# Patient Record
Sex: MALE | Race: WHITE
[De-identification: names, ages, dates, MRNs, and addresses within clinical notes are randomized per-mention and may not be internally consistent; named-entity substitution may affect disease eponyms.]

---

## 2017-02-23 ENCOUNTER — HOSPITAL ENCOUNTER (EMERGENCY)
Dept: HOSPITAL 17 - PHED | Age: 72
Discharge: HOME | End: 2017-02-23
Payer: COMMERCIAL

## 2017-02-23 VITALS
RESPIRATION RATE: 18 BRPM | DIASTOLIC BLOOD PRESSURE: 92 MMHG | SYSTOLIC BLOOD PRESSURE: 184 MMHG | OXYGEN SATURATION: 93 % | HEART RATE: 84 BPM | TEMPERATURE: 97.9 F

## 2017-02-23 VITALS
DIASTOLIC BLOOD PRESSURE: 87 MMHG | SYSTOLIC BLOOD PRESSURE: 161 MMHG | RESPIRATION RATE: 18 BRPM | OXYGEN SATURATION: 96 % | HEART RATE: 87 BPM

## 2017-02-23 VITALS
HEART RATE: 81 BPM | SYSTOLIC BLOOD PRESSURE: 169 MMHG | RESPIRATION RATE: 18 BRPM | OXYGEN SATURATION: 96 % | DIASTOLIC BLOOD PRESSURE: 94 MMHG

## 2017-02-23 VITALS — HEIGHT: 70 IN | WEIGHT: 277.78 LBS | BODY MASS INDEX: 39.77 KG/M2

## 2017-02-23 VITALS
SYSTOLIC BLOOD PRESSURE: 162 MMHG | RESPIRATION RATE: 18 BRPM | HEART RATE: 84 BPM | DIASTOLIC BLOOD PRESSURE: 88 MMHG | OXYGEN SATURATION: 96 %

## 2017-02-23 DIAGNOSIS — B34.9: ICD-10-CM

## 2017-02-23 DIAGNOSIS — R94.31: ICD-10-CM

## 2017-02-23 DIAGNOSIS — H61.23: ICD-10-CM

## 2017-02-23 DIAGNOSIS — H83.09: Primary | ICD-10-CM

## 2017-02-23 DIAGNOSIS — N18.9: ICD-10-CM

## 2017-02-23 LAB
ALP SERPL-CCNC: 85 U/L (ref 45–117)
ALT SERPL-CCNC: 28 U/L (ref 12–78)
ANION GAP SERPL CALC-SCNC: 10 MEQ/L (ref 5–15)
AST SERPL-CCNC: 17 U/L (ref 15–37)
BACTERIA #/AREA URNS HPF: (no result) /HPF
BASOPHILS # BLD AUTO: 0.2 TH/MM3 (ref 0–0.2)
BASOPHILS NFR BLD: 2.1 % (ref 0–2)
BILIRUB SERPL-MCNC: 0.3 MG/DL (ref 0.2–1)
BUN SERPL-MCNC: 41 MG/DL (ref 7–18)
CHLORIDE SERPL-SCNC: 105 MEQ/L (ref 98–107)
COLOR UR: YELLOW
COMMENT (UR): (no result)
CULTURE IF INDICATED: (no result)
EOSINOPHIL # BLD: 0.8 TH/MM3 (ref 0–0.4)
EOSINOPHIL NFR BLD: 8.4 % (ref 0–4)
ERYTHROCYTE [DISTWIDTH] IN BLOOD BY AUTOMATED COUNT: 12.9 % (ref 11.6–17.2)
GFR SERPLBLD BASED ON 1.73 SQ M-ARVRAT: 28 ML/MIN (ref 89–?)
GLUCOSE UR STRIP-MCNC: (no result) MG/DL
HCO3 BLD-SCNC: 24.5 MEQ/L (ref 21–32)
HCT VFR BLD CALC: 42.4 % (ref 39–51)
HEMO FLAGS: (no result)
HGB UR QL STRIP: (no result)
KETONES UR STRIP-MCNC: (no result) MG/DL
LEUKOCYTE ESTERASE UR QL STRIP: (no result) /HPF (ref 0–5)
LYMPHOCYTES # BLD AUTO: 1.9 TH/MM3 (ref 1–4.8)
LYMPHOCYTES NFR BLD AUTO: 21.4 % (ref 9–44)
MAGNESIUM SERPL-MCNC: 2.4 MG/DL (ref 1.5–2.5)
MCH RBC QN AUTO: 28.6 PG (ref 27–34)
MCHC RBC AUTO-ENTMCNC: 32.9 % (ref 32–36)
MCV RBC AUTO: 86.8 FL (ref 80–100)
MONOCYTES NFR BLD: 6.2 % (ref 0–8)
NEUTROPHILS # BLD AUTO: 5.5 TH/MM3 (ref 1.8–7.7)
NEUTROPHILS NFR BLD AUTO: 61.9 % (ref 16–70)
NITRITE UR QL STRIP: (no result)
PLATELET # BLD: 254 TH/MM3 (ref 150–450)
POTASSIUM SERPL-SCNC: 4.2 MEQ/L (ref 3.5–5.1)
RBC # BLD AUTO: 4.89 MIL/MM3 (ref 4.5–5.9)
RBC #/AREA URNS HPF: (no result) /HPF (ref 0–3)
SODIUM SERPL-SCNC: 139 MEQ/L (ref 136–145)
SP GR UR STRIP: 1.02 (ref 1–1.03)
SQUAMOUS #/AREA URNS HPF: (no result) /HPF (ref 0–5)
WBC # BLD AUTO: 9 TH/MM3 (ref 4–11)

## 2017-02-23 PROCEDURE — 96374 THER/PROPH/DIAG INJ IV PUSH: CPT

## 2017-02-23 PROCEDURE — 85025 COMPLETE CBC W/AUTO DIFF WBC: CPT

## 2017-02-23 PROCEDURE — 83735 ASSAY OF MAGNESIUM: CPT

## 2017-02-23 PROCEDURE — 96361 HYDRATE IV INFUSION ADD-ON: CPT

## 2017-02-23 PROCEDURE — 99284 EMERGENCY DEPT VISIT MOD MDM: CPT

## 2017-02-23 PROCEDURE — 80053 COMPREHEN METABOLIC PANEL: CPT

## 2017-02-23 PROCEDURE — 70450 CT HEAD/BRAIN W/O DYE: CPT

## 2017-02-23 PROCEDURE — 81001 URINALYSIS AUTO W/SCOPE: CPT

## 2017-02-23 PROCEDURE — 84484 ASSAY OF TROPONIN QUANT: CPT

## 2017-02-23 PROCEDURE — 93005 ELECTROCARDIOGRAM TRACING: CPT

## 2017-02-23 NOTE — RADHPO
EXAM DATE/TIME:  02/23/2017 20:43 

 

[facility] COMPARISON:     

No previous studies available for comparison.

 

 

INDICATIONS :     

Dizziness.

                      

 

RADIATION DOSE:     

62.07 CTDIvol (mGy) 

 

 

 

MEDICAL HISTORY :     

Carcinoma, not otherwise specified. Hypertension. Cerebrovascular disease.

 

SURGICAL HISTORY :      

Nephrectomy, left. 

 

ENCOUNTER:      

Initial

 

ACUITY:      

1 day

 

PAIN SCALE:      

0/10

 

LOCATION:       

cranial 

 

TECHNIQUE:     

Multiple contiguous axial images were obtained of the head.  Using automated exposure control and adj
ustment of the mA and/or kV according to patient size, radiation dose was kept as low as reasonably a
chievable to obtain optimal diagnostic quality images. 

 

FINDINGS:     

 

CEREBRUM:     

The ventricles are normal for age.  No evidence of midline shift, mass lesion, hemorrhage or acute in
farction.  No extra-axial fluid collections are seen. Moderate microvascular ischemic demyelinization
 of the deep white matter

 

POSTERIOR FOSSA:     

The cerebellum and brainstem are intact.  The 4th ventricle is midline.  The cerebellopontine angle i
s unremarkable.

 

EXTRACRANIAL:     

The visualized portion of the orbits is intact. Vascular calcifications in the internal carotid arter
ies in the siphon and vertebral arteries at the foramen

 

SKULL:     

The calvaria is intact.  No evidence of skull fracture.

 

CONCLUSION:     

Extensive vascular calcifications. Microvascular ischemic demyelinization in deep white matter chroni
c. No acute abnormality.

 

 

 

 Andrew Mclean MD on February 23, 2017 at 21:00           

Board Certified Radiologist.

 This report was verified electronically.

## 2017-02-23 NOTE — PD
HPI


Chief Complaint:  Neuro Symptoms/ Deficits


Time Seen by Provider:  19:05


Travel History


International Travel<30 days:  No


Contact w/Intl Traveler<30days:  No


Traveled to known affect area:  No





History of Present Illness


HPI


The patient is a 72-year-old female that at approximately 6 PM tonight was 

riding in a car when he felt severe vertigo and nausea without vomiting.  The 

patient has just been getting over a "cold".  Also, both ear canals are stopped 

up and, despite using softening medicine for a week he has been referred to ENT 

on 9 March to clean out his ears.  He denies any ear pain or fever.  He denies 

any focal weakness.  He denies any headache.  The patient sees a nephrologist 

and has a history of renal insufficiency.  He has had one kidney removed.  He 

has a mass on the other kidney.  The nephrologist recently stated that his 

levels were "stable".





PFSH


Past Medical History


Cancer:  Yes (SUSPECTED L RENAL CARCINOMA)


Cardiovascular Problems:  No


Diabetes:  No


Endocrine:  No


Gastrointestinal Disorders:  No


Genitourinary:  No


Hepatitis:  No


Hiatal Hernia:  No


Hypertension:  Yes


Immune Disorder:  No


Medical other:  No


Musculoskeletal:  Yes (ARTHRITIS IN KNEE--L KNEE REPLACEMENT)


Neurologic:  Yes (TIA 2010)


Psychiatric:  No


Reproductive:  No


Respiratory:  Yes (SLEEP APNEA, 2 LITERS OXYGEN AT NIGHT)


Thyroid Disease:  No


Influenza Vaccination:  Yes





Past Surgical History


Abdominal Surgery:  No


AICD:  No


Cardiac Surgery:  No


Ear Surgery:  No


Eye Surgery:  No


Genitourinary Surgery:  Yes (LEFT KIDNEY REMOVED)


Gynecologic Surgery:  No


Joint Replacement:  Yes (L KNEE)


Oral Surgery:  No


Pacemaker:  No


Thoracic Surgery:  No


Other Surgery:  Yes





Social History


Alcohol Use:  No


Tobacco Use:  No


Substance Use:  No





Allergies-Medications


(Allergen,Severity, Reaction):  


Coded Allergies:  


     No Known Allergies (Unverified , 2/23/17)


Reported Meds & Prescriptions





Reported Meds & Active Scripts


Active


Reported


Nasonex Nasal Spray (Mometasone Furoate) 50 Mcg/Act Naspr 2 [place] EACH NARE 

DAILY


Diclofenac Sodium DR (Diclofenac Sodium) 25 Mg Tabdr 25 Mg PO BID


Zorvolex (Diclofenac) 35 Mg Cap 25 Mg PO TID


Omeprazole 20 Mg Tab 20 Mg PO DAILY


Simvastatin 40 Mg Tab 40 Mg PO HS


Amlodipine (Amlodipine Besylate) 10 Mg Tab 10 Mg PO DAILY


Hydrochlorothiazide 25 Mg Tab 25 Mg PO DAILY


Lisinopril 40 Mg Tab 80 Mg PO DAILY


Carvedilol 25 Mg Tab 50 Mg PO BID








Review of Systems


Except as stated in HPI:  all other systems reviewed are Neg





Physical Exam


Narrative


GENERAL: The patient is alert, oriented 3 in moderate apparent distress with 

his vertigo.  He holds his head completely still.  His vital signs show blood 

pressure 184/92 and otherwise normal.


SKIN: Warm and dry.


HEAD: Atraumatic. Normocephalic. 


EYES: Pupils equal and round. No scleral icterus. No injection or drainage. 


ENT: No nasal bleeding or discharge.  Mucous membranes pink and moist.  There 

is horizontal nystagmus when the patient looks to one side or the other.  

Neither tympanic membrane can be seen due to hard, inspissated wax in the 

canals.  This is unlikely to respond to irrigation.


NECK: Trachea midline. No JVD.  No carotid bruits are heard.


CARDIOVASCULAR: Regular rate and rhythm.  No murmur appreciated.


RESPIRATORY: No accessory muscle use. Clear to auscultation. Breath sounds 

equal bilaterally. 


GASTROINTESTINAL: Abdomen soft, non-tender, nondistended. Hepatic and splenic 

margins not palpable. 


MUSCULOSKELETAL: No obvious deformities. No clubbing.  No cyanosis.  No edema. 


NEUROLOGICAL: Awake and alert. No obvious cranial nerve deficits.  Motor 

grossly within normal limits. Normal speech.


PSYCHIATRIC: Appropriate mood and affect; insight and judgment normal.





Data


Data


Last Documented VS





Vital Signs








  Date Time  Temp Pulse Resp B/P Pulse Ox O2 Delivery O2 Flow Rate FiO2


 


2/23/17 20:41  87 18 161/87 96 Room Air  


 


2/23/17 18:35 97.9       








Orders





 Electrocardiogram (2/23/17 19:15)


Complete Blood Count With Diff (2/23/17 19:15)


Comprehensive Metabolic Panel (2/23/17 19:15)


Troponin I (2/23/17 19:15)


Urinalysis - C+S If Indicated (2/23/17 19:15)


Magnesium (Mg) (2/23/17 19:15)


Ct Brain W/O Iv Contrast(Rout) (2/23/17 19:15)


Ondansetron Inj (Zofran Inj) (2/23/17 19:30)


Sodium Chlor 0.9% 1000 Ml Inj (Ns 1000 M (2/23/17 19:30)


Meclizine (Antivert) (2/23/17 19:45)





Labs





 Laboratory Tests








Test 2/23/17 2/23/17





 19:20 20:50


 


White Blood Count 9.0 TH/MM3 


 


Red Blood Count 4.89 MIL/MM3 


 


Hemoglobin 14.0 GM/DL 


 


Hematocrit 42.4 % 


 


Mean Corpuscular Volume 86.8 FL 


 


Mean Corpuscular Hemoglobin 28.6 PG 


 


Mean Corpuscular Hemoglobin 32.9 % 





Concent  


 


Red Cell Distribution Width 12.9 % 


 


Platelet Count 254 TH/MM3 


 


Mean Platelet Volume 8.2 FL 


 


Neutrophils (%) (Auto) 61.9 % 


 


Lymphocytes (%) (Auto) 21.4 % 


 


Monocytes (%) (Auto) 6.2 % 


 


Eosinophils (%) (Auto) 8.4 % 


 


Basophils (%) (Auto) 2.1 % 


 


Neutrophils # (Auto) 5.5 TH/MM3 


 


Lymphocytes # (Auto) 1.9 TH/MM3 


 


Monocytes # (Auto) 0.6 TH/MM3 


 


Eosinophils # (Auto) 0.8 TH/MM3 


 


Basophils # (Auto) 0.2 TH/MM3 


 


CBC Comment DIFF FINAL  


 


Differential Comment   


 


Sodium Level 139 MEQ/L 


 


Potassium Level 4.2 MEQ/L 


 


Chloride Level 105 MEQ/L 


 


Carbon Dioxide Level 24.5 MEQ/L 


 


Anion Gap 10 MEQ/L 


 


Blood Urea Nitrogen 41 MG/DL 


 


Creatinine 2.30 MG/DL 


 


Estimat Glomerular Filtration 28 ML/MIN 





Rate  


 


Random Glucose 135 MG/DL 


 


Calcium Level 8.6 MG/DL 


 


Magnesium Level 2.4 MG/DL 


 


Total Bilirubin 0.3 MG/DL 


 


Aspartate Amino Transf 17 U/L 





(AST/SGOT)  


 


Alanine Aminotransferase 28 U/L 





(ALT/SGPT)  


 


Alkaline Phosphatase 85 U/L 


 


Troponin I LESS THAN 0.02 





 NG/ML 


 


Total Protein 7.9 GM/DL 


 


Albumin 3.8 GM/DL 


 


Urine Color  YELLOW 


 


Urine Turbidity  CLEAR 


 


Urine pH  5.5 


 


Urine Specific Gravity  1.016 


 


Urine Protein  TRACE mg/dL


 


Urine Glucose (UA)  NEG mg/dL


 


Urine Ketones  NEG mg/dL


 


Urine Occult Blood  TRACE 


 


Urine Nitrite  NEG 


 


Urine Bilirubin  NEG 


 


Urine Leukocyte Esterase  NEG 


 


Urine RBC  0-3 /hpf


 


Urine WBC  0-2 /hpf


 


Urine Squamous Epithelial  0-5 /hpf





Cells  


 


Urine Bacteria  NONE /hpf


 


Microscopic Urinalysis Comment  CULT NOT





  INDICATED











MDM


Medical Decision Making


Medical Screen Exam Complete:  Yes


Emergency Medical Condition:  Yes


Medical Record Reviewed:  Yes


Interpretation(s)


The CT brain shows extensive vascular calcifications, microvascular ischemic 

demyelinization in deep white matter which is chronic but no acute abnormality.

  The urine shows trace blood but is otherwise normal and culture is not 

indicated.  The CBC is normal.  The complete metabolic profile shows a BUN of 41

, creatinine of 2.3, GFR of 28, glucose 135 but is otherwise unremarkable.  The 

troponin I is normal.  The magnesium is normal.


Differential Diagnosis


Viral labyrinthitis, benign positional vertigo, ischemic CVAunlikely, 

electrolyte disorder, renal insufficiency, acute coronary syndromeunlikely


Narrative Course


The patient likely has labyrinthitis.  This may be related to the recent virus 

that he had.  It is difficult to tell whether his bilateral cerumen impaction 

has anything to do with this.





It is now 927 p.m. and the patient's vertigo has subsided and his nausea has 

resolved.  He can now open his eyes and moving his head without a problem.





Diagnosis





 Primary Impression:  


 Viral labyrinthitis


 Additional Impressions:  


 Chronic renal insufficiency


 Bilateral impacted cerumen





***Additional Instructions:


Put Debrox in both ears as instructed by the label.  Do not miss your ENT 

appointment.


***Med/Other Pt SpecificInfo:  Prescription(s) given


Scripts


Promethazine (Phenergan)25 Mg Tab25 Mg PO Q6H PRN (Nausea/Vomiting) #40 TAB  

Ref 0


   Prov:Tomas Duque MD         2/23/17 


Meclizine 25 Mg Tab25 Mg PO TID PRN (VERTIGO) #45 TAB  Ref 0


   Prov:Tomas Duque MD         2/23/17


Disposition:  01 DISCHARGE HOME


Condition:  Stable








Tomas Duque MD Feb 23, 2017 19:23

## 2017-09-12 ENCOUNTER — HOSPITAL ENCOUNTER (INPATIENT)
Dept: HOSPITAL 17 - HSDI | Age: 72
LOS: 8 days | Discharge: HOME | DRG: 167 | End: 2017-09-20
Attending: THORACIC SURGERY (CARDIOTHORACIC VASCULAR SURGERY) | Admitting: THORACIC SURGERY (CARDIOTHORACIC VASCULAR SURGERY)
Payer: COMMERCIAL

## 2017-09-12 VITALS — HEIGHT: 72 IN | BODY MASS INDEX: 40.76 KG/M2 | WEIGHT: 300.93 LBS

## 2017-09-12 DIAGNOSIS — C78.02: Primary | ICD-10-CM

## 2017-09-12 DIAGNOSIS — Z96.652: ICD-10-CM

## 2017-09-12 DIAGNOSIS — R91.8: ICD-10-CM

## 2017-09-12 DIAGNOSIS — Z85.528: ICD-10-CM

## 2017-09-12 DIAGNOSIS — Z90.5: ICD-10-CM

## 2017-09-12 DIAGNOSIS — N28.9: ICD-10-CM

## 2017-09-12 DIAGNOSIS — G47.30: ICD-10-CM

## 2017-09-12 DIAGNOSIS — E66.9: ICD-10-CM

## 2017-09-12 DIAGNOSIS — I10: ICD-10-CM

## 2017-09-12 DIAGNOSIS — Z86.73: ICD-10-CM

## 2017-09-12 DIAGNOSIS — Z80.42: ICD-10-CM

## 2017-09-12 PROCEDURE — 86920 COMPATIBILITY TEST SPIN: CPT

## 2017-09-12 PROCEDURE — 94664 DEMO&/EVAL PT USE INHALER: CPT

## 2017-09-12 PROCEDURE — C9290 INJ, BUPIVACAINE LIPOSOME: HCPCS

## 2017-09-12 PROCEDURE — 80048 BASIC METABOLIC PNL TOTAL CA: CPT

## 2017-09-12 PROCEDURE — 94620: CPT

## 2017-09-12 PROCEDURE — 86850 RBC ANTIBODY SCREEN: CPT

## 2017-09-12 PROCEDURE — 85025 COMPLETE CBC W/AUTO DIFF WBC: CPT

## 2017-09-12 PROCEDURE — 88307 TISSUE EXAM BY PATHOLOGIST: CPT

## 2017-09-12 PROCEDURE — 71010: CPT

## 2017-09-12 PROCEDURE — 86900 BLOOD TYPING SEROLOGIC ABO: CPT

## 2017-09-12 PROCEDURE — 88331 PATH CONSLTJ SURG 1 BLK 1SPC: CPT

## 2017-09-12 PROCEDURE — 86901 BLOOD TYPING SEROLOGIC RH(D): CPT

## 2017-09-12 PROCEDURE — 94150 VITAL CAPACITY TEST: CPT

## 2017-09-12 PROCEDURE — 94640 AIRWAY INHALATION TREATMENT: CPT

## 2017-09-18 VITALS
OXYGEN SATURATION: 92 % | TEMPERATURE: 99.4 F | DIASTOLIC BLOOD PRESSURE: 75 MMHG | SYSTOLIC BLOOD PRESSURE: 151 MMHG | RESPIRATION RATE: 16 BRPM | HEART RATE: 100 BPM

## 2017-09-18 VITALS
HEART RATE: 82 BPM | TEMPERATURE: 98.5 F | SYSTOLIC BLOOD PRESSURE: 136 MMHG | DIASTOLIC BLOOD PRESSURE: 75 MMHG | RESPIRATION RATE: 17 BRPM | OXYGEN SATURATION: 93 %

## 2017-09-18 VITALS — OXYGEN SATURATION: 92 %

## 2017-09-18 VITALS
DIASTOLIC BLOOD PRESSURE: 73 MMHG | SYSTOLIC BLOOD PRESSURE: 124 MMHG | HEART RATE: 71 BPM | OXYGEN SATURATION: 92 % | RESPIRATION RATE: 20 BRPM | TEMPERATURE: 97.5 F

## 2017-09-18 VITALS
HEART RATE: 96 BPM | DIASTOLIC BLOOD PRESSURE: 70 MMHG | OXYGEN SATURATION: 97 % | SYSTOLIC BLOOD PRESSURE: 136 MMHG | TEMPERATURE: 99.3 F | RESPIRATION RATE: 18 BRPM

## 2017-09-18 VITALS — HEART RATE: 94 BPM

## 2017-09-18 VITALS — OXYGEN SATURATION: 97 %

## 2017-09-18 VITALS — HEART RATE: 92 BPM

## 2017-09-18 PROCEDURE — 8E0W4CZ ROBOTIC ASSISTED PROCEDURE OF TRUNK REGION, PERCUTANEOUS ENDOSCOPIC APPROACH: ICD-10-PCS | Performed by: THORACIC SURGERY (CARDIOTHORACIC VASCULAR SURGERY)

## 2017-09-18 PROCEDURE — 3E0T3CZ: ICD-10-PCS

## 2017-09-18 PROCEDURE — 0BBJ4ZX EXCISION OF LEFT LOWER LUNG LOBE, PERCUTANEOUS ENDOSCOPIC APPROACH, DIAGNOSTIC: ICD-10-PCS | Performed by: THORACIC SURGERY (CARDIOTHORACIC VASCULAR SURGERY)

## 2017-09-18 RX ADMIN — ALBUTEROL SULFATE SCH MG: 2.5 SOLUTION RESPIRATORY (INHALATION) at 21:04

## 2017-09-18 RX ADMIN — OXYCODONE HYDROCHLORIDE AND ACETAMINOPHEN PRN TAB: 5; 325 TABLET ORAL at 20:10

## 2017-09-18 RX ADMIN — PANTOPRAZOLE SCH MG: 40 TABLET, DELAYED RELEASE ORAL at 20:10

## 2017-09-18 RX ADMIN — ACETAMINOPHEN SCH MLS/HR: 10 INJECTION, SOLUTION INTRAVENOUS at 17:14

## 2017-09-18 RX ADMIN — OXYCODONE HYDROCHLORIDE AND ACETAMINOPHEN PRN TAB: 5; 325 TABLET ORAL at 23:41

## 2017-09-18 RX ADMIN — ACETAMINOPHEN SCH MLS/HR: 10 INJECTION, SOLUTION INTRAVENOUS at 23:00

## 2017-09-18 RX ADMIN — SODIUM CHLORIDE, PRESERVATIVE FREE SCH ML: 5 INJECTION INTRAVENOUS at 21:00

## 2017-09-18 RX ADMIN — ALBUTEROL SULFATE SCH MG: 2.5 SOLUTION RESPIRATORY (INHALATION) at 16:01

## 2017-09-18 NOTE — RADRPT
EXAM DATE/TIME:  09/18/2017 10:46 

 

[facility] COMPARISON:     

CHEST SINGLE AP, April 08, 2014, 15:36.

 

                     

INDICATIONS :     

Post op thoracotomy.

                     

 

MEDICAL HISTORY :            

left lower lung nodule   

 

SURGICAL HISTORY :        

Needle Biopsy

 

ENCOUNTER:     

Initial                                        

 

ACUITY:     

1 day      

 

PAIN SCORE:     

0/10

 

LOCATION:     

Bilateral chest 

 

FINDINGS:     

Portable AP views of the chest demonstrate a normal-sized cardiac silhouette. A large bore left chest
 tube is present in the left hemithorax. No pneumothorax is visualized. Lungs are mildly underinflate
d. There is no effusion or airspace consolidation. Bones and soft tissues demonstrate no acute findin
g.

 

CONCLUSION:     

Left chest tube is present and no pneumothorax is visualized.

 

 

 

 Juan Bonilla MD on September 18, 2017 at 11:25           

Board Certified Radiologist.

 This report was verified electronically.

## 2017-09-18 NOTE — PD.OP
cc:   Jasmine Fierro MD; Mack Ashraf MD; OBDULIA Sharp MD


__________________________________________________





Operative Report


Date of Surgery:  Sep 18, 2017


Preoperative Diagnosis:  


Postoperative Diagnosis:  


Procedure:


1. Robotic Left Lower Lobe Mass Resection


2. Intercostal Nerve Block


Surgeon:


Jasmine Fierro


Assistant(s):


WILLIAMS Finch


Operation and Findings:








PREOPERATIVE DIAGNOSIS 


1. Left Lower Lobe Lung Nodule


2. Renal Cell Carcinoma - s/p nephrectomy


3. Right Lung Nodules


4. Renal Insufficiency





POSTOPERATIVE DIAGNOSIS 


same





PROCEDURES 


1. Robotic Left Lower Lobe Mass Resection


2. Intercostal Nerve Block





SURGEON 


Jasmine Fierro MD


 


ASSISTANT 


ALEXIA Alan RNFA





ANESTHESIA 


General endotracheal. 





ANESTHETIST


GALA Heredia MD





OPERATIVE TIME 


Please see record. 





COMPLICATIONS 


None. 





INDICATION FOR PROCEDURE 


The patient is a 71 yo gentleman  with h/o RC, now presenting with a slowly 

enlarging left lower lobe lung mass. 





DESCRIPTION OF PROCEDURE 


The patient was brought to the operating suite and placed in supine position. 

Following satisfactory induction of general endotracheal anesthesia, the 

patient was placed in the right lateral decubitus position. The left chest was 

then prepped and draped in the usual sterile fashion.  Under direct vision, 

camera was introduced in the 9th ICS and insufflation was begun into the chest 

. Instrument arm 1 and 2 were placed. Lesion was identified in the basilar 

segment of the lower lobe adjacent to the underlying diaphragm. The lesion was 

resected en bloc with a lung stapler. Specimen was bagged and removed from the 

chest. Frozen section was consistent with a metastatic renal cell carcinoma. A 

28-Malaysian chest tube was placed. Intercostal nerve block was performed at the 

level of the incision and 3 rib spaces above and below using Exparel with 

Decadron solution. Hemostasis was assured and no air-leaks were identified. 

Wounds were closed with 2-0, 3-0, and 4-0 Monocryl. The patient tolerated the  

procedure well and postoperatively went to recovery in stable condition.











Jasmine Fierro MD Sep 18, 2017 14:24

## 2017-09-19 VITALS
HEART RATE: 88 BPM | TEMPERATURE: 98.3 F | OXYGEN SATURATION: 92 % | SYSTOLIC BLOOD PRESSURE: 117 MMHG | DIASTOLIC BLOOD PRESSURE: 64 MMHG | RESPIRATION RATE: 20 BRPM

## 2017-09-19 VITALS — HEART RATE: 76 BPM

## 2017-09-19 VITALS
SYSTOLIC BLOOD PRESSURE: 143 MMHG | OXYGEN SATURATION: 92 % | DIASTOLIC BLOOD PRESSURE: 78 MMHG | RESPIRATION RATE: 16 BRPM | TEMPERATURE: 98.1 F | HEART RATE: 94 BPM

## 2017-09-19 VITALS — HEART RATE: 96 BPM

## 2017-09-19 VITALS — HEART RATE: 74 BPM

## 2017-09-19 VITALS
RESPIRATION RATE: 19 BRPM | HEART RATE: 91 BPM | TEMPERATURE: 98.3 F | SYSTOLIC BLOOD PRESSURE: 161 MMHG | DIASTOLIC BLOOD PRESSURE: 82 MMHG | OXYGEN SATURATION: 94 %

## 2017-09-19 VITALS — HEART RATE: 80 BPM

## 2017-09-19 VITALS
TEMPERATURE: 98.5 F | HEART RATE: 85 BPM | SYSTOLIC BLOOD PRESSURE: 128 MMHG | RESPIRATION RATE: 20 BRPM | OXYGEN SATURATION: 91 % | DIASTOLIC BLOOD PRESSURE: 69 MMHG

## 2017-09-19 VITALS — HEART RATE: 84 BPM

## 2017-09-19 VITALS
TEMPERATURE: 98.6 F | RESPIRATION RATE: 16 BRPM | HEART RATE: 80 BPM | DIASTOLIC BLOOD PRESSURE: 74 MMHG | OXYGEN SATURATION: 90 % | SYSTOLIC BLOOD PRESSURE: 138 MMHG

## 2017-09-19 VITALS — OXYGEN SATURATION: 93 %

## 2017-09-19 VITALS — HEART RATE: 78 BPM

## 2017-09-19 VITALS — HEART RATE: 94 BPM

## 2017-09-19 VITALS — HEART RATE: 88 BPM

## 2017-09-19 VITALS — HEART RATE: 86 BPM

## 2017-09-19 VITALS — HEART RATE: 75 BPM

## 2017-09-19 VITALS
SYSTOLIC BLOOD PRESSURE: 146 MMHG | DIASTOLIC BLOOD PRESSURE: 76 MMHG | TEMPERATURE: 98.6 F | HEART RATE: 84 BPM | OXYGEN SATURATION: 91 % | RESPIRATION RATE: 16 BRPM

## 2017-09-19 VITALS — OXYGEN SATURATION: 92 %

## 2017-09-19 VITALS — HEART RATE: 90 BPM

## 2017-09-19 LAB
ANION GAP SERPL CALC-SCNC: 8 MEQ/L (ref 5–15)
BASOPHILS # BLD AUTO: 0 TH/MM3 (ref 0–0.2)
BASOPHILS NFR BLD: 0.1 % (ref 0–2)
BUN SERPL-MCNC: 36 MG/DL (ref 7–18)
CHLORIDE SERPL-SCNC: 101 MEQ/L (ref 98–107)
EOSINOPHIL # BLD: 0 TH/MM3 (ref 0–0.4)
EOSINOPHIL NFR BLD: 0 % (ref 0–4)
ERYTHROCYTE [DISTWIDTH] IN BLOOD BY AUTOMATED COUNT: 13.6 % (ref 11.6–17.2)
GFR SERPLBLD BASED ON 1.73 SQ M-ARVRAT: 25 ML/MIN (ref 89–?)
HCO3 BLD-SCNC: 24.5 MEQ/L (ref 21–32)
HCT VFR BLD CALC: 40.8 % (ref 39–51)
HEMO FLAGS: (no result)
LYMPHOCYTES # BLD AUTO: 1 TH/MM3 (ref 1–4.8)
LYMPHOCYTES NFR BLD AUTO: 6.8 % (ref 9–44)
MCH RBC QN AUTO: 28.9 PG (ref 27–34)
MCHC RBC AUTO-ENTMCNC: 32.3 % (ref 32–36)
MCV RBC AUTO: 89.5 FL (ref 80–100)
MONOCYTES NFR BLD: 7.4 % (ref 0–8)
NEUTROPHILS # BLD AUTO: 12.7 TH/MM3 (ref 1.8–7.7)
NEUTROPHILS NFR BLD AUTO: 85.7 % (ref 16–70)
PLATELET # BLD: 236 TH/MM3 (ref 150–450)
POTASSIUM SERPL-SCNC: 4.5 MEQ/L (ref 3.5–5.1)
RBC # BLD AUTO: 4.56 MIL/MM3 (ref 4.5–5.9)
SODIUM SERPL-SCNC: 133 MEQ/L (ref 136–145)
WBC # BLD AUTO: 14.9 TH/MM3 (ref 4–11)

## 2017-09-19 RX ADMIN — ASPIRIN SCH MG: 81 TABLET ORAL at 09:00

## 2017-09-19 RX ADMIN — PANTOPRAZOLE SCH MG: 40 TABLET, DELAYED RELEASE ORAL at 20:47

## 2017-09-19 RX ADMIN — DOCUSATE SODIUM SCH MG: 100 CAPSULE, LIQUID FILLED ORAL at 20:47

## 2017-09-19 RX ADMIN — OXYCODONE HYDROCHLORIDE AND ACETAMINOPHEN PRN TAB: 5; 325 TABLET ORAL at 03:47

## 2017-09-19 RX ADMIN — ALBUTEROL SULFATE SCH MG: 2.5 SOLUTION RESPIRATORY (INHALATION) at 05:07

## 2017-09-19 RX ADMIN — ACETAMINOPHEN SCH MLS/HR: 10 INJECTION, SOLUTION INTRAVENOUS at 05:00

## 2017-09-19 RX ADMIN — HYDROCODONE BITARTRATE AND ACETAMINOPHEN PRN TAB: 5; 325 TABLET ORAL at 22:00

## 2017-09-19 RX ADMIN — POLYETHYLENE GLYCOL 3350 SCH GM: 17 POWDER, FOR SOLUTION ORAL at 11:56

## 2017-09-19 RX ADMIN — HYDROCODONE BITARTRATE AND ACETAMINOPHEN PRN TAB: 5; 325 TABLET ORAL at 17:46

## 2017-09-19 RX ADMIN — FLUTICASONE PROPIONATE SCH SPRAY: 50 SPRAY, METERED NASAL at 09:01

## 2017-09-19 RX ADMIN — ALBUTEROL SULFATE SCH MG: 2.5 SOLUTION RESPIRATORY (INHALATION) at 09:41

## 2017-09-19 RX ADMIN — DOCUSATE SODIUM SCH MG: 100 CAPSULE, LIQUID FILLED ORAL at 11:56

## 2017-09-19 RX ADMIN — CARVEDILOL SCH MG: 12.5 TABLET, FILM COATED ORAL at 20:47

## 2017-09-19 RX ADMIN — CARVEDILOL SCH MG: 12.5 TABLET, FILM COATED ORAL at 09:00

## 2017-09-19 RX ADMIN — HYDROCHLOROTHIAZIDE SCH MG: 25 TABLET ORAL at 09:00

## 2017-09-19 RX ADMIN — SODIUM CHLORIDE, PRESERVATIVE FREE SCH ML: 5 INJECTION INTRAVENOUS at 09:01

## 2017-09-19 RX ADMIN — SODIUM CHLORIDE, PRESERVATIVE FREE SCH ML: 5 INJECTION INTRAVENOUS at 20:48

## 2017-09-19 RX ADMIN — ACETAMINOPHEN SCH MLS/HR: 10 INJECTION, SOLUTION INTRAVENOUS at 11:56

## 2017-09-19 RX ADMIN — ALBUTEROL SULFATE SCH MG: 2.5 SOLUTION RESPIRATORY (INHALATION) at 21:18

## 2017-09-19 RX ADMIN — ALBUTEROL SULFATE SCH MG: 2.5 SOLUTION RESPIRATORY (INHALATION) at 16:02

## 2017-09-19 RX ADMIN — LISINOPRIL SCH MG: 20 TABLET ORAL at 09:00

## 2017-09-19 NOTE — RADRPT
EXAM DATE/TIME:  09/19/2017 04:49 

 

[facility] COMPARISON:     

CHEST SINGLE AP, September 18, 2017, 10:46.

 

                     

INDICATIONS :     

Short of breath.

                     

 

MEDICAL HISTORY :            

left lower lung nodule.   

 

SURGICAL HISTORY :        

Needle Biopsy.

 

ENCOUNTER:     

Subsequent                                        

 

ACUITY:     

4 - 6 days      

 

PAIN SCORE:     

0/10

 

LOCATION:     

Bilateral  chest

 

FINDINGS:     

2 portable frontal views of the chest show a single left-sided thoracostomy tube without pneumothorax
 or effusion. Low lung volumes bilaterally. No discrete infiltrate. Heart is normal in size.

 

CONCLUSION:     

Left thoracostomy tube without pneumothorax or effusion.

 

 

 

 Mo Velasco Jr., MD on September 19, 2017 at 5:16           

Board Certified Radiologist.

 This report was verified electronically.

## 2017-09-19 NOTE — MB
cc:

FADI BROOKS MD, RIZALINA M.D. WHITE, R. STEVEN M.D. KHANNA, SOHIT K. MD

****

 

 

DATE OF CONSULTATION

2017

 

DATE OF BIRTH

1945

 

PRIMARY CARE PHYSICIAN

Dr. Shawnee Blake

 

REASON FOR CONSULTATION

Renal cell carcinoma with metastatic disease to the lungs.  Oncology has been

asked to see this patient to discuss additional systemic therapeutic options.

 

CHIEF COMPLAINT

Mr. Hughes denies acute complaints.  He tells me he does have some discomfort

along the left chest wall, but this is minimal.  He is looking forward to going

home soon.

 

HISTORY OF PRESENT ILLNESS

Mr. Hughes is a very pleasant 72-year-old male who is originally from [facility].  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

 

Mr. Hughes was diagnosed in 2014 with a renal cell carcinoma

involving the left kidney.  The tumor measured 4 x 3.5 x 3 cm and was unifocal 

and clear cell histology.  Final pathologic stage was T1a, NX.  Following 
resection, 

the patient underwent periodic restaging imaging scans and was found to have a 

slowly enlarging left lower lobe pulmonary nodule.  This could not be biopsied 

percutaneously therefore open biopsy was recommended.  He underwent surgical 

resection of this lesion on 2017.  The surgery performed was a robot 
assisted

left lower lobe mass resection.  Preliminary pathologic findings indicated 
metastatic renal 

cell carcinoma.  He is recovering well post op.

 

 

PAST MEDICAL HISTORY

1.  Renal cell carcinoma

2.  Hypertension

3.  Obesity

4.  TIA

 

PAST SURGICAL HISTORY

1.  Left total knee replacement

2.  Left nephrectomy

3.  Robot assisted left lower lobe lung mass resection.

 

FAMILY HISTORY

Father had prostate carcinoma in his 80s.  Mother  of coronary artery

disease and strokes.  Maternal grandfather had cancer of unknown primary.

Nephew was diagnosed with renal cell carcinoma in his 50s.

 

SOCIAL HISTORY

The patient is originally from [place], he worked in [employer], he now

lives at home with his wife.  He has four children of his own.  He has 19

grandchildren and two great-grandchildren.  He reports hardly ever having

smoked in the past and denies alcohol can abuse and tells me he drinks only

socially.

 

ALLERGIES

NO KNOWN DRUG ALLERGIES.

 

CURRENT INPATIENT MEDICATIONS

1.  Cefazolin 1 gram IV q.8 h

2.  Ringer's lactate 30 mL per hour

3.  Tylenol 650 mg p.o. q.4 h

4.  Albuterol 2.5 mg nebulized q.6 h

5.  Amlodipine 10 mg p.o. daily

6.  Aspirin 81 mg once a day

7.  Colace 250 mg p.o. q.h.s.

8.  Flonase two sprays per nostril daily.

9.  HCTZ 25 mg once a day

10. Insulin per sliding scale protocol.

11. Lisinopril 20 mg once a day

12. Pantoprazole 40 mcg p.o. daily

13. Pravastatin 80 mg p.o. q.h.s.

 

REVIEW OF SYSTEMS

A 13-point review of systems is obtained.  The patient's major complaint is

that of some pain at the site of surgical incisions, he otherwise denies

fevers, chills, night sweats, chest pain ( angina-like), hemoptysis, abdominal

pain, nausea, vomiting, diarrhea, hematochezia, melena, dysuria, hematuria,

urinary incontinence.  He denies any focal sensory motor deficits.

 

PHYSICAL EXAMINATION

Temperature 98.1 degrees Fahrenheit, heart rate 94-80 beats per minute,

respiratory rate 16, blood pressure 143/78, O2 sats 92% on room air.

GENERAL PHYSICAL APPEARANCE:  Mr. Hughes is an elderly male, he is morbidly

obese and sitting up in bed, he appears to be in no acute distress and has a

pleasant disposition.

HEENT:  Head is atraumatic, normocephalic, conjunctive are not pale, sclerae

are anicteric, EOMI, PERRLA, oral exam, no pharyngeal erythema.

NECK:  No palpable cervical or supraclavicular lymphadenopathy.

RESPIRATORY:  Good air movement bilaterally.  Good inspiratory effort, no added

breath sounds, specifically no rhonchi, rales or wheezes.

CARDIOVASCULAR:  Regular rate and rhythm, S1, S2.  No obvious murmurs, rubs or

gallops.

ABDOMEN:  Obese, soft, nontender, and nondistended.  No palpable organ

enlargement.  No tenderness.

CHEST:  On the left chest wall, there is a chest tube in place.  EXTREMITIES:

Lower extremities have no pretibial edema or calf tenderness.  He does have

pneumatic compression devices.

CNS:  No focal sensory or motor deficits.

 

LABORATORY FINDINGS

Dated 2017:  WBC count 15, hemoglobin 13.2 gm/dl, hematocrit 40.8%,

platelet count 236, absolute neutrophil count 12.7.

 

Chemistries:  Sodium 133, potassium 4.5, chloride 101, bicarb 24.5, BUN 36,

creatinine 2.54, EGFR 25, random glucose 127, calcium 8.1.

 

ASSESSMENT

Mr. Hughes is a 72-year-old male with a history of renal cell carcinoma (clear

cell type) involving the left kidney, this was resected in 2014.  The

patient was found to have enlarging left lower lobe pulmonary nodule in the

costophrenic recess.  Percutaneous biopsies were attempted however, were not

possible given the location and size of the lesion.  He therefore underwent

open biopsy and resection.  Preliminary pathology is consistent with metastatic

renal cell carcinoma.  In addition to the left lower lobe pulmonary nodule, the

patient also had right-sided pulmonary nodules which have had a more indolent

course.  In addition to the renal cell carcinoma, Mr. Hughes has significant

other medical comorbid conditions including chronic renal insufficiency,

hypertension, obesity, and hyperlipidemia.

 

The oncology service has been asked to see him to coordinate outpatient care.

 

RECOMMENDATIONS

Renal cell carcinoma with biopsy-proven pulmonary metastasis:  At this point, I

would like to schedule followup with Mr. Hughes in my clinic in the upcoming

weeks.  I would like to restage him to assess for additional tumor burden.  If

he does have additional tumor burden which is measurable, it would be

reasonable to either target the lesions with radiation to render him

without evidence of disease versus initiating him on palliative systemic

therapy.  If he is completely without evidence of disease, it may be reasonable

to keep him on observation alone as opposed to initiating a therapeutic

intervention.  It should be noted that the patient's primary tumor was resected

over three years ago and that in the past 3-1/2 years or so he has developed

just one area of metastasis (as far as we know thus far).  This indicates his 
disease 

has a rather indolent biology.

 

Future follow-up visits have been scheduled.

 

 

 

 

 

                              _________________________________

                              MD ELVIS Webb/LYNETTE

D:  2017/7:25 AM

T:  2017/8:50 AM

Visit #:  [id number]

Job #:  [id number]

ANDRY

## 2017-09-19 NOTE — PD.CAR.PN
CVT Progress Note


CVT: POD #:  1


Subjective/Hospital Course:


72/m diagnosed with renal cell carcinoma involving left kidney 4/2014/ tumor 

was consistent with renal cell carcinoma ( clear cell type) final path T1a, NX 

pt underwent resection / per Oncology note, he had periodic restaging imaging 

scans and was found to have a slowly enlarging left lower lobe pulmonary nodule








PMH: Renal cell carcinoma, HTN, TIA, obesity





surgery :  Robotic Left Lower Lobe Mass Resection  9/18


frozen path: clear cell type similar to pt previous renal cell carcinoma 





9/19


chest tube with no air leak, minimal drainage


by was seen by Oncologist Dr RYLAND Ashraf / outpt f/u pending 





pain controlled/ additional GI motility meds added  





OOB ambulate


Objective:


GENERAL: A&O x 3 


SKIN: Warm and dry.incision x 3 intact and well approximated left lateral chest 

wall 





HEAD: Normocephalic.


EYES: No scleral icterus. No injection or drainage. 


NECK: Supple, trachea midline. No JVD or lymphadenopathy.


CARDIOVASCULAR: Regular rate and rhythm without murmurs, gallops, or rubs. 


RESPIRATORY: Breath sounds equal bilaterally. No accessory muscle use.


chest in left chest in place, no air leak, to wall suction / minimal drainage 

10cc/ 12 hrs 


GASTROINTESTINAL: Abdomen soft, non-tender, nondistended. 


MUSCULOSKELETAL: No cyanosis, or edema. 


BACK: Nontender without obvious deformity. No CVA tenderness.








Vital Signs








  Date Time  Temp Pulse Resp B/P (MAP) Pulse Ox O2 Delivery O2 Flow Rate FiO2


 


9/19/17 09:42     93 Nasal Cannula 2.00 


 


9/19/17 06:00  84      


 


9/19/17 05:00  80      


 


9/19/17 04:00  84      


 


9/19/17 03:00  94      


 


9/19/17 03:00 98.1 93 16 143/78 (99) 92   


 


9/19/17 02:00  84      


 


9/19/17 01:00  90      


 


9/19/17 00:00  94      


 


9/18/17 23:00 99.4 99 16 151/75 (100) 92   


 


9/18/17 23:00  100      


 


9/18/17 22:00  94      


 


9/18/17 21:05     92 Nasal Cannula 2.00 


 


9/18/17 21:00  94      


 


9/18/17 20:00  92      


 


9/18/17 19:00 99.3 96 18 136/70 (92) 97   


 


9/18/17 19:00  91      


 


9/18/17 17:38     97 Nasal Cannula 2.00 


 


9/18/17 15:00  82      


 


9/18/17 15:00 98.5 80 17 136/75 (95) 93   








Labs:





Laboratory Tests








Test


  9/19/17


05:10


 


White Blood Count


  14.9 TH/MM3


(4.0-11.0)


 


Red Blood Count


  4.56 MIL/MM3


(4.50-5.90)


 


Hemoglobin


  13.2 GM/DL


(13.0-17.0)


 


Hematocrit


  40.8 %


(39.0-51.0)


 


Mean Corpuscular Volume


  89.5 FL


(80.0-100.0)


 


Mean Corpuscular Hemoglobin


  28.9 PG


(27.0-34.0)


 


Mean Corpuscular Hemoglobin


Concent 32.3 %


(32.0-36.0)


 


Red Cell Distribution Width


  13.6 %


(11.6-17.2)


 


Platelet Count


  236 TH/MM3


(150-450)


 


Mean Platelet Volume


  8.7 FL


(7.0-11.0)


 


Neutrophils (%) (Auto)


  85.7 %


(16.0-70.0)


 


Lymphocytes (%) (Auto)


  6.8 %


(9.0-44.0)


 


Monocytes (%) (Auto)


  7.4 %


(0.0-8.0)


 


Eosinophils (%) (Auto)


  0.0 %


(0.0-4.0)


 


Basophils (%) (Auto)


  0.1 %


(0.0-2.0)


 


Neutrophils # (Auto)


  12.7 TH/MM3


(1.8-7.7)


 


Lymphocytes # (Auto)


  1.0 TH/MM3


(1.0-4.8)


 


Monocytes # (Auto)


  1.1 TH/MM3


(0-0.9)


 


Eosinophils # (Auto)


  0.0 TH/MM3


(0-0.4)


 


Basophils # (Auto)


  0.0 TH/MM3


(0-0.2)


 


CBC Comment DIFF FINAL 


 


Differential Comment  


 


Blood Urea Nitrogen


  36 MG/DL


(7-18)


 


Creatinine


  2.53 MG/DL


(0.60-1.30)


 


Random Glucose


  127 MG/DL


()


 


Calcium Level


  8.1 MG/DL


(8.5-10.1)


 


Sodium Level


  133 MEQ/L


(136-145)


 


Potassium Level


  4.5 MEQ/L


(3.5-5.1)


 


Chloride Level


  101 MEQ/L


()


 


Carbon Dioxide Level


  24.5 MEQ/L


(21.0-32.0)


 


Anion Gap 8 MEQ/L (5-15) 


 


Estimat Glomerular Filtration


Rate 25 ML/MIN


(>89)








Result Diagram:  


9/19/17 0510                                                                   

             9/19/17 0510





Telemetry:


NSR





(1)  Robotic Left Lower Lobe Mass Resection


Plan:  frozen path similar to pt prior renal carcinoma 





outp f/u with Oncology 





continue nebs, pulm toileting , wean off 02 








remove chest tube in am 











(2) Renal cell carcinoma of left kidney


(3) Malignant neoplasm of kidney











Terwilliger,Jacqueline R. ARNP Sep 19, 2017 14:57

## 2017-09-20 VITALS
RESPIRATION RATE: 12 BRPM | DIASTOLIC BLOOD PRESSURE: 69 MMHG | HEART RATE: 78 BPM | OXYGEN SATURATION: 90 % | SYSTOLIC BLOOD PRESSURE: 123 MMHG | TEMPERATURE: 97.8 F

## 2017-09-20 VITALS — HEART RATE: 68 BPM

## 2017-09-20 VITALS — HEART RATE: 70 BPM

## 2017-09-20 VITALS
HEART RATE: 79 BPM | OXYGEN SATURATION: 93 % | SYSTOLIC BLOOD PRESSURE: 148 MMHG | DIASTOLIC BLOOD PRESSURE: 77 MMHG | TEMPERATURE: 98 F | RESPIRATION RATE: 20 BRPM

## 2017-09-20 VITALS
TEMPERATURE: 98.2 F | DIASTOLIC BLOOD PRESSURE: 74 MMHG | RESPIRATION RATE: 18 BRPM | OXYGEN SATURATION: 90 % | HEART RATE: 79 BPM | SYSTOLIC BLOOD PRESSURE: 135 MMHG

## 2017-09-20 VITALS — HEART RATE: 78 BPM

## 2017-09-20 VITALS — OXYGEN SATURATION: 92 %

## 2017-09-20 VITALS — HEART RATE: 87 BPM

## 2017-09-20 VITALS — HEART RATE: 76 BPM

## 2017-09-20 VITALS — HEART RATE: 74 BPM

## 2017-09-20 RX ADMIN — DOCUSATE SODIUM SCH MG: 100 CAPSULE, LIQUID FILLED ORAL at 08:02

## 2017-09-20 RX ADMIN — SODIUM CHLORIDE, PRESERVATIVE FREE SCH ML: 5 INJECTION INTRAVENOUS at 08:02

## 2017-09-20 RX ADMIN — LISINOPRIL SCH MG: 20 TABLET ORAL at 08:01

## 2017-09-20 RX ADMIN — ALBUTEROL SULFATE SCH MG: 2.5 SOLUTION RESPIRATORY (INHALATION) at 09:46

## 2017-09-20 RX ADMIN — POLYETHYLENE GLYCOL 3350 SCH GM: 17 POWDER, FOR SOLUTION ORAL at 08:01

## 2017-09-20 RX ADMIN — CARVEDILOL SCH MG: 12.5 TABLET, FILM COATED ORAL at 08:01

## 2017-09-20 RX ADMIN — ASPIRIN SCH MG: 81 TABLET ORAL at 08:01

## 2017-09-20 RX ADMIN — ALBUTEROL SULFATE SCH MG: 2.5 SOLUTION RESPIRATORY (INHALATION) at 05:14

## 2017-09-20 RX ADMIN — HYDROCODONE BITARTRATE AND ACETAMINOPHEN PRN TAB: 5; 325 TABLET ORAL at 08:02

## 2017-09-20 RX ADMIN — HYDROCHLOROTHIAZIDE SCH MG: 25 TABLET ORAL at 08:01

## 2017-09-20 RX ADMIN — FLUTICASONE PROPIONATE SCH SPRAY: 50 SPRAY, METERED NASAL at 08:02

## 2017-09-20 RX ADMIN — HYDROCODONE BITARTRATE AND ACETAMINOPHEN PRN TAB: 5; 325 TABLET ORAL at 05:00

## 2017-09-20 NOTE — HHI.FF
Face to Face Verification


Diagnosis:  


(1) Sleep apnea


(2)  Robotic Left Lower Lobe Mass Resection


(3) Renal cell carcinoma of left kidney


Home Health Nursing








Order: Signs/symptoms of disease process





 Medication education-adverse effect





 Wound care and dressing changes





 Nursing assessment with vital signs








Instructions:


Thoracic Surgery patients


Mandatory frequency


Assess and evaluation, 2-3 x a week for one week  


Initial visit


1.   Review post chest surgery instructions chest  precautions, Activity, 

Elastic hose, Incision care, Driving, Incentive spirometry, Smoking, [place]

, Work and other)


2.   Need Betadine to paint incision


3.   Medication reconciliation


4.   Importance of follow up care/ check on appointments


5.   Make calendar  record temperature daily


6.   When to call  Home nurse, review instructions, phone list


7.   Incentive Spirometry, demonstration


Visit 1- Begin discharge instruction for patient family and/ or caregiver using 

teach back method-


1.   Signs and symptoms of infection


2.   Disease characteristics


3.   Medicines and side effects


4.   Foods and nutrition/ appetite


5.   Infection control/ hand washing/ hygiene


Visit 2- Continue teaching


1.   Discharge instructions- include additional information on smoking cessation

, 


Visit 3- Continue teaching- 


1.   Cough and deep breathing, incision monitoring.








Incentive spirometry Q1 hr x 10, while awake, also use acapella device hourly 

whole awake 





chest wall  Precautions: NO pushing or pulling, ( pt must use chest  pillow to 

support chest with all activities and with coughing 





Daily incision care:  ok to shower daily ( starting friday 9/22)  no tub bath.  

Wash all incisions with liquid dial soap, clean wash cloth to each site, rinse 

and pat dry.  Observe for any signs of infection, such as drainage which is 

dark yellow, gray, green or foul smelling.  Immediately report to the surgeon 

any drainage from the chest incision, or legs, and for any abnormal drainage 

from the chest tube sites.  Notify surgeon if any temp >101.5 degrees F.  When 

specialty dressing removed/ or if you do not have one, continue to shower daily 

as above, then rinse and pat incision dry and paint with betadine daily x 5 

days.  Allow steri strips to fall off if you have any.  Avoid lotions, creams, 

salves, oils, etc. for the first month_





F/U appointment: as per [facility] instructions: PCP in 2 weeks, CV surgeon 2 weeks,  


Pulmonologist 4 weeks, Oncologist 2 weeks 





For any questions regarding incisions/ dressing / meds / post op care or above 

                            


Symptoms, 





Monday Friday 8am-5pm   Heart & Vascular Surgery Office


(  Dr. Fierro & Dr. Sotelo), [phone]





After Hours / Nights (5pm -8am) Weekends and Holidays 


Please call [facility] ([facility]) Charge Nurse 


[phone]











I have seen patient Conner Hughes on 9/20/17. My clinical findings support the 

need for the requested home health care services because:








 Deconditioned w/ increased weakness














I certify that my clinical findings support that this patient is homebound 

because:








 Post-op weakness








cancel above order











Terwilliger,Jacqueline R. ARNP Sep 20, 2017 12:46

## 2017-09-20 NOTE — RADRPT
EXAM DATE/TIME:  09/20/2017 11:03 

 

[facility] COMPARISON:     

CHEST SINGLE AP, September 19, 2017, 4:49.

 

                     

INDICATIONS :     

Post chest tube removal.

                     

 

MEDICAL HISTORY :            

left lower lung nodule.   

 

SURGICAL HISTORY :        

Needle Biopsy.

 

ENCOUNTER:     

Subsequent                                        

 

ACUITY:     

1 day      

 

PAIN SCORE:     

0/10

 

LOCATION:     

Bilateral chest 

 

FINDINGS:     

The heart is mildly enlarged. There are consolidative changes at the left lung base. There is minimal
 left basilar effusion. These changes are similar compared to previous exam. The osseous structures a
re grossly intact.

 

CONCLUSION:     

1. No pneumothorax post chest tube removal. There is some mild consolidative change at the left lung 
base.

 

 

 

 Gómez Wagoner MD on September 20, 2017 at 11:37           

Board Certified Radiologist.

 This report was verified electronically.

## 2017-09-20 NOTE — HHI.DS
Discharge Summary


Admission Date


Sep 18, 2017 at 05:30


Discharge Date:  Sep 20, 2017


Admitting Diagnosis


left lung mass





(1)  Robotic Left Lower Lobe Mass Resection


Diagnosis:  Secondary





(2) Sleep apnea


Diagnosis:  Principal


ICD Codes:  G47.30 - Sleep apnea


Status:  Chronic


(3) Personal history of renal cancer


Diagnosis:  Principal


ICD Codes:  Z85.528 - Personal history of renal cancer


Status:  Chronic


Procedures


1. Robotic Left Lower Lobe Mass Resection  9/18


Brief History


72/m diagnosed with renal cell carcinoma involving left kidney 4/2014/ tumor 

was consistent with renal cell carcinoma ( clear cell type) final path T1a, NX 

pt underwent resection / per Oncology note, he had periodic restaging imaging 

scans and was found to have a slowly enlarging left lower lobe pulmonary nodule








PMH: Renal cell carcinoma, HTN, TIA, obesity





surgery :  Robotic Left Lower Lobe Mass Resection  9/18


frozen path: clear cell type similar to pt previous renal cell carcinoma


CBC/BMP:  


9/19/17 0510                                                                   

             9/19/17 0510





Significant Findings





Laboratory Tests








Test


  9/19/17


05:10


 


White Blood Count


  14.9 TH/MM3


(4.0-11.0)


 


Neutrophils (%) (Auto)


  85.7 %


(16.0-70.0)


 


Lymphocytes (%) (Auto)


  6.8 %


(9.0-44.0)


 


Neutrophils # (Auto)


  12.7 TH/MM3


(1.8-7.7)


 


Monocytes # (Auto)


  1.1 TH/MM3


(0-0.9)


 


Blood Urea Nitrogen


  36 MG/DL


(7-18)


 


Creatinine


  2.53 MG/DL


(0.60-1.30)


 


Random Glucose


  127 MG/DL


()


 


Calcium Level


  8.1 MG/DL


(8.5-10.1)


 


Sodium Level


  133 MEQ/L


(136-145)


 


Estimat Glomerular Filtration


Rate 25 ML/MIN


(>89)








Imaging





Last Impressions








Chest X-Ray 9/20/17 1100 Signed





Impressions: 





 Service Date/Time:  Wednesday, September 20, 2017 11:03 - CONCLUSION:  1. No 





 pneumothorax post chest tube removal. There is some mild consolidative change 

at 





 the left lung base.     Gómez Wagoner MD 








PE at Discharge


GENERAL: 


SKIN: Warm and dry. incisions x 3 intact left lateral chest wall / dressing in 

place over chest tube site 


HEAD: Normocephalic.


EYES: No scleral icterus. No injection or drainage. 


NECK: Supple, trachea midline. No JVD or lymphadenopathy.


CARDIOVASCULAR: Regular rate and rhythm without murmurs, gallops, or rubs. 


RESPIRATORY: Breath sounds equal bilaterally. No accessory muscle use.


GASTROINTESTINAL: Abdomen soft, non-tender, nondistended. 


MUSCULOSKELETAL: No cyanosis, or edema. 


BACK: Nontender without obvious deformity. No CVA tenderness.





Hospital Course


9/19


chest tube with no air leak, minimal drainage


by was seen by Oncologist Dr RYLAND Ashraf / outpt f/u pending 





pain controlled/ additional GI motility meds added  





OOB ambulate 





9/20


chest tube dc without difficulty 


02 sat 88 -90


desats when sleeping, has CPAP machine at home, but doesn't use it 


will give dose of lasix, then check home 02 walk test 


eval for dc home today








home walk test 90% Room air 


will dc home


Pt Condition on Discharge:  Good


Discharge Disposition:  Discharge Home


Discharge Instructions


DIET: Follow Instructions for:  As Tolerated, No Restrictions, Heart Healthy 

Diet


Activities you can perform:  Shower Only-No Bath


Activities to avoid:  Strenuous Activity, Driving


Additional Activity Instructio:  


no lifting > 8 lbs or gallon of milk 





no driving x 2 weeks


New Medications:  


Docusate Sodium (Dok) 100 Mg Cap


100 MG PO DAILY for Constipation, #30 CAP 0 Refills





Hydrocodone-Acetaminophen (Hydrocodone-Acetaminophen) 5-325 mg Tab


1 TAB PO Q6H PRN for PAIN SCALE 3 TO 5, #40 TAB 0 Refills





 


Continued Medications:  


Amlodipine (Amlodipine) 10 Mg Tab


10 MG PO DAILY for Blood Pressure Management, #30 TAB 0 Refills





Aspirin DR (Aspirin 81) 81 Mg Tabdr


81 MG PO DAILY, TAB 0 Refills





Carvedilol (Coreg) 25 Mg Tab


25 MG PO BID, #60 TAB 0 Refills





Hydrochlorothiazide (Hydrochlorothiazide) 25 Mg Tab


25 MG PO DAILY, #30 TAB 0 Refills





Lisinopril (Lisinopril) 40 Mg Tab


40 MG PO DAILY for Blood Pressure Management, #30 TAB 0 Refills





Mometasone Nasal Spray (Nasonex Nasal Spray) 50 Mcg/Act Naspr


2 SPRAY EACH NARE DAILY for Allergy Management, #1 BOTTLE 0 Refills





Simvastatin (Simvastatin) 40 Mg Tab


40 MG PO HS for Cholesterol Management, #30 TAB 0 Refills

















Terwilliger,Nita R. ARNP Sep 20, 2017 13:28

## 2017-09-20 NOTE — PD.CAR.PN
CVT Progress Note


Subjective/Hospital Course:


72/m diagnosed with renal cell carcinoma involving left kidney 4/2014/ tumor 

was consistent with renal cell carcinoma ( clear cell type) final path T1a, NX 

pt underwent resection / per Oncology note, he had periodic restaging imaging 

scans and was found to have a slowly enlarging left lower lobe pulmonary nodule








PMH: Renal cell carcinoma, HTN, TIA, obesity





surgery :  Robotic Left Lower Lobe Mass Resection  9/18


frozen path: clear cell type similar to pt previous renal cell carcinoma 





9/19


chest tube with no air leak, minimal drainage


by was seen by Oncologist Dr RYLAND Ashraf / outpt f/u pending 





pain controlled/ additional GI motility meds added  





OOB ambulate 





9/20


chest tube dc without difficulty 


02 sat 88 -90


desats when sleeping, has CPAP machine at home, but doesn't use it 


will give dose of lasix, then check home 02 walk test 


eval for dc home today


Objective:


GENERAL: 


SKIN: Warm and dry. incision x 3 intact left chest area / dressing over chest 

tube site 


HEAD: Normocephalic.


EYES: No scleral icterus. No injection or drainage. 


NECK: Supple, trachea midline. No JVD or lymphadenopathy.


CARDIOVASCULAR: Regular rate and rhythm without murmurs, gallops, or rubs. 


RESPIRATORY: Breath sounds equal bilaterally. No accessory muscle use.


GASTROINTESTINAL: Abdomen soft, non-tender, nondistended. 


MUSCULOSKELETAL: No cyanosis, or edema. 


BACK: Nontender without obvious deformity. No CVA tenderness.








Vital Signs








  Date Time  Temp Pulse Resp B/P (MAP) Pulse Ox O2 Delivery O2 Flow Rate FiO2


 


9/20/17 10:38  76      


 


9/20/17 09:31   18     


 


9/20/17 08:45 98.0 77 20 148/77 (100) 93   


 


9/20/17 08:45  79      


 


9/20/17 06:00  68      


 


9/20/17 05:21     92 Nasal Cannula 3.00 


 


9/20/17 05:00  78      


 


9/20/17 04:00  70      


 


9/20/17 03:00  78      


 


9/20/17 03:00 97.8 78 12 123/69 (87) 90   


 


9/20/17 02:00  70      


 


9/20/17 01:00  70      


 


9/20/17 00:00  74      


 


9/19/17 23:16 98.6 80 16 138/74 (95) 90   


 


9/19/17 23:00  76      


 


9/19/17 22:00  88      


 


9/19/17 21:18     92   21


 


9/19/17 21:00  84      


 


9/19/17 20:00  86      


 


9/19/17 19:25 98.6 84 16 146/76 (99) 91   


 


9/19/17 19:00  75      


 


9/19/17 18:00  78      


 


9/19/17 17:00  80      


 


9/19/17 16:03     93   21


 


9/19/17 16:00  86      


 


9/19/17 15:00  75      


 


9/19/17 15:00 98.5 85 20 128/69 (88) 91   


 


9/19/17 14:00  76      


 


9/19/17 13:00  74      


 


9/19/17 12:00  80      


 


9/19/17 11:00 98.3 88 20 117/64 (81) 92   


 


9/19/17 11:00  74      








Result Diagram:  


9/19/17 0510                                                                   

             9/19/17 0510








(1)  Robotic Left Lower Lobe Mass Resection


Plan:  frozen path similar to pt prior renal carcinoma 





outp f/u with Oncology 





continue nebs, pulm toileting , wean off 02 








remove chest tube in am 











(2) Renal cell carcinoma of left kidney


(3) Malignant neoplasm of kidney


(4) Sleep apnea


Plan:  will need to use home CPAP 





eval for need for home 02 














Terwilliger,Jacqueline R. ARNP Sep 20, 2017 10:58

## 2018-06-04 ENCOUNTER — HOSPITAL ENCOUNTER (OUTPATIENT)
Dept: HOSPITAL 17 - HROP | Age: 73
Discharge: HOME | End: 2018-06-04
Attending: INTERNAL MEDICINE
Payer: COMMERCIAL

## 2018-06-04 VITALS
RESPIRATION RATE: 18 BRPM | SYSTOLIC BLOOD PRESSURE: 135 MMHG | HEART RATE: 79 BPM | OXYGEN SATURATION: 92 % | DIASTOLIC BLOOD PRESSURE: 78 MMHG

## 2018-06-04 VITALS
HEART RATE: 71 BPM | DIASTOLIC BLOOD PRESSURE: 64 MMHG | SYSTOLIC BLOOD PRESSURE: 133 MMHG | RESPIRATION RATE: 18 BRPM | TEMPERATURE: 97.9 F | OXYGEN SATURATION: 94 %

## 2018-06-04 VITALS
RESPIRATION RATE: 20 BRPM | SYSTOLIC BLOOD PRESSURE: 150 MMHG | HEART RATE: 80 BPM | OXYGEN SATURATION: 96 % | DIASTOLIC BLOOD PRESSURE: 91 MMHG | TEMPERATURE: 98.2 F

## 2018-06-04 VITALS — HEIGHT: 72 IN | WEIGHT: 290.13 LBS | BODY MASS INDEX: 39.3 KG/M2

## 2018-06-04 VITALS
OXYGEN SATURATION: 92 % | HEART RATE: 69 BPM | RESPIRATION RATE: 18 BRPM | DIASTOLIC BLOOD PRESSURE: 56 MMHG | SYSTOLIC BLOOD PRESSURE: 123 MMHG

## 2018-06-04 VITALS
SYSTOLIC BLOOD PRESSURE: 145 MMHG | DIASTOLIC BLOOD PRESSURE: 77 MMHG | RESPIRATION RATE: 16 BRPM | OXYGEN SATURATION: 92 % | HEART RATE: 78 BPM

## 2018-06-04 DIAGNOSIS — E66.9: ICD-10-CM

## 2018-06-04 DIAGNOSIS — C64.2: Primary | ICD-10-CM

## 2018-06-04 DIAGNOSIS — I10: ICD-10-CM

## 2018-06-04 DIAGNOSIS — C78.02: ICD-10-CM

## 2018-06-04 DIAGNOSIS — Z90.5: ICD-10-CM

## 2018-06-04 DIAGNOSIS — Z86.73: ICD-10-CM

## 2018-06-04 DIAGNOSIS — G47.30: ICD-10-CM

## 2018-06-04 PROCEDURE — 99152 MOD SED SAME PHYS/QHP 5/>YRS: CPT

## 2018-06-04 PROCEDURE — 99211 OFF/OP EST MAY X REQ PHY/QHP: CPT

## 2018-06-04 PROCEDURE — C1788 PORT, INDWELLING, IMP: HCPCS

## 2018-06-04 PROCEDURE — 36561 INSERT TUNNELED CV CATH: CPT

## 2018-06-04 PROCEDURE — C1769 GUIDE WIRE: HCPCS

## 2018-06-04 PROCEDURE — G0463 HOSPITAL OUTPT CLINIC VISIT: HCPCS

## 2018-06-04 PROCEDURE — 76937 US GUIDE VASCULAR ACCESS: CPT

## 2018-06-04 PROCEDURE — 99153 MOD SED SAME PHYS/QHP EA: CPT

## 2018-06-04 PROCEDURE — 77001 FLUOROGUIDE FOR VEIN DEVICE: CPT

## 2018-06-04 NOTE — PD.RAD
Post Procedure Progress Note


Pre Procedure Diagnosis:  


(1) Malignant neoplasm of kidney


Post Procedure Diagnosis:  


(1) Malignant neoplasm of kidney


Procedure Date:


Jun 4, 2018


Supervising Radiologist:


Mo Velasco JR


Proceduralist/Assist:  RT Ezequiel(R)(CV), Other


Anesthesia:  Conscious Sedation


Plan of Activity


Patient to Unit:  [facility]


Patient Condition:  Good


See PACS Report for procedural detail/treatment





Central Venous Access Device


Procedure 1


Left


Infusaport


Placement


single lumen


Romansh:  8


Findings:


Port in good position and functions well. OK to use.


Plan


F/U with IR or a physician in 10-14 days for a site check.











Jr. Rod,Mo Mishra MD Jun 4, 2018 09:55

## 2018-06-04 NOTE — RADRPT
EXAM DATE:  6/4/2018 10:07 AM EDT

AGE/SEX:        73 years / Male



INDICATIONS:  Patient with metastatic renal cell carcinoma. He will be receiving chemotherapy.



CLINICAL DATA:  This is the patient's initial encounter. Patient reports that signs and symptoms have
 been present for 2 weeks and indicates a pain score of 0/10. 

                                                                          

MEDICAL/SURGICAL HISTORY:       Carcinoma, lung. HTN TIA Mass rt adrenal gland sleep apnea obesity pi
tuitary adenoma  Nephrectomy, left. Lt knee TKA lower lobe lung mass resection



COMPARISON:      No prior [place] exams available for comparison.



FLUORO TIME (min):     3.4

IMAGE SERIES:               3

SEDATION TIME (min):  35







MEDICATION(S):

2 mg midazolam (Versed) IV

100 mcg fentanyl (Sublimaze) IV









Prophylactic antibiotics were administered with appropriate pre-procedure timing.



DEVICE(S):

Left 8 fr xcela plus port









 

. .



PROCEDURE :    

1.  Continuous pulse oximetry and EKG monitoring.

2.  Intravenous conscious sedation.

3.  Ultrasound guidance for venous access.

4.  Fluoroscopic guided implantable central venous port placement.



The patient was placed supine. The neck was prepped in sterile fashion.  Full sterile technique was u
sed, including cap, mask, sterile gloves and gown, and a large sterile sheet.  Hand hygiene and 2% ch
lorhexidine Betadine was utilized per protocol for cutaneous antisepsis with appropriate dry time for
 site.  Sterile gel and sterile probe cover were utilized for ultrasound guidance.   The skin and sub
cutaneous tissues were infiltrated with local anesthetic solution.  



Under direct ultrasound guidance, central venous access was accomplished in the targeted vessel.  The
 ultrasound images depicting access guidance were stored and saved to PACS for permanent record.  A s
ubcutaneous pocket was created using blunt dissection.  The port was introduced to the pocket.  The c
atheter tubing was fed through a subcutaneous tunnel to the venotomy site.  The catheter tubing was c
ut to a suitable length and then was introduced through a valved Peel-Away sheath. Although the tip o
f the catheter was positioned at the cavoatrial junction as soon as the peel-away sheath was removed 
the catheter retracted a fair amount due to the tortuosity of the vessels. A longer catheter was ther
efore utilized and the tip positioned low within the right atrium. No ectopy observed. The pocket inc
ision was closed with subcuticular Vicryl suture.  Steri-Strips were applied.  The port was flushed a
nd locked with heparin solution per protocol.  Sterile dressing was applied to the site.  The patient
 tolerated the procedure well.



Conscious sedation was performed with the prescribed dosages and duration as above in the presence of
 an independent trained radiology nurse to assist in the monitoring of the patient.  EKG and oximetry
 remained stable throughout the procedure. The patient tolerated the procedure well and there were no
 complications. The patient was sent to post anesthesia recovery in stable condition.



CONCLUSION:  

1.  Uncomplicated ultrasound and fluoroscopic guided implanted central venous port catheter placement
 as described in detail above.  An 8 [employer] port was placed. The tip of the catheter was purpos
efully placed fairly low within the right atrium. This was done to try to maintain appropriate positi
on of the catheter as a shorter catheter was utilized and had a tendency to flipped up into the SVC.



Electronically signed by: Mo Velasco MD  6/4/2018 1:06 PM EDT

## 2019-08-13 ENCOUNTER — APPOINTMENT (RX ONLY)
Dept: URBAN - METROPOLITAN AREA CLINIC 52 | Facility: CLINIC | Age: 74
Setting detail: DERMATOLOGY
End: 2019-08-13

## 2019-08-13 DIAGNOSIS — B35.6 TINEA CRURIS: ICD-10-CM

## 2019-08-13 DIAGNOSIS — D18.0 HEMANGIOMA: ICD-10-CM

## 2019-08-13 DIAGNOSIS — D485 NEOPLASM OF UNCERTAIN BEHAVIOR OF SKIN: ICD-10-CM

## 2019-08-13 DIAGNOSIS — L30.0 NUMMULAR DERMATITIS: ICD-10-CM

## 2019-08-13 DIAGNOSIS — D22 MELANOCYTIC NEVI: ICD-10-CM

## 2019-08-13 DIAGNOSIS — L20.89 OTHER ATOPIC DERMATITIS: ICD-10-CM

## 2019-08-13 DIAGNOSIS — L81.4 OTHER MELANIN HYPERPIGMENTATION: ICD-10-CM

## 2019-08-13 DIAGNOSIS — L85.3 XEROSIS CUTIS: ICD-10-CM

## 2019-08-13 DIAGNOSIS — L82.1 OTHER SEBORRHEIC KERATOSIS: ICD-10-CM

## 2019-08-13 DIAGNOSIS — L57.8 OTHER SKIN CHANGES DUE TO CHRONIC EXPOSURE TO NONIONIZING RADIATION: ICD-10-CM

## 2019-08-13 PROBLEM — L20.84 INTRINSIC (ALLERGIC) ECZEMA: Status: ACTIVE | Noted: 2019-08-13

## 2019-08-13 PROBLEM — N18.9 CHRONIC KIDNEY DISEASE, UNSPECIFIED: Status: ACTIVE | Noted: 2019-08-13

## 2019-08-13 PROBLEM — I10 ESSENTIAL (PRIMARY) HYPERTENSION: Status: ACTIVE | Noted: 2019-08-13

## 2019-08-13 PROBLEM — D18.01 HEMANGIOMA OF SKIN AND SUBCUTANEOUS TISSUE: Status: ACTIVE | Noted: 2019-08-13

## 2019-08-13 PROBLEM — D22.9 MELANOCYTIC NEVI, UNSPECIFIED: Status: ACTIVE | Noted: 2019-08-13

## 2019-08-13 PROBLEM — E27.40 UNSPECIFIED ADRENOCORTICAL INSUFFICIENCY: Status: ACTIVE | Noted: 2019-08-13

## 2019-08-13 PROBLEM — D48.5 NEOPLASM OF UNCERTAIN BEHAVIOR OF SKIN: Status: ACTIVE | Noted: 2019-08-13

## 2019-08-13 PROCEDURE — ? INVENTORY

## 2019-08-13 PROCEDURE — 99203 OFFICE O/P NEW LOW 30 MIN: CPT | Mod: 25

## 2019-08-13 PROCEDURE — 11102 TANGNTL BX SKIN SINGLE LES: CPT

## 2019-08-13 PROCEDURE — ? BIOPSY BY SHAVE METHOD

## 2019-08-13 PROCEDURE — ? PRESCRIPTION

## 2019-08-13 PROCEDURE — ? COUNSELING

## 2019-08-13 RX ORDER — BETAMETHASONE DIPROPIONATE 0.5 MG/G
1 CREAM TOPICAL BID
Qty: 1 | Refills: 1 | Status: ERX | COMMUNITY
Start: 2019-08-13

## 2019-08-13 RX ORDER — KETOCONAZOLE 20 MG/G
1 CREAM TOPICAL BID
Qty: 1 | Refills: 1 | Status: ERX | COMMUNITY
Start: 2019-08-13

## 2019-08-13 RX ORDER — TRIAMCINOLONE ACETONIDE 1 MG/G
1 CREAM TOPICAL BID
Qty: 1 | Refills: 1 | Status: ERX | COMMUNITY
Start: 2019-08-13

## 2019-08-13 RX ADMIN — TRIAMCINOLONE ACETONIDE 1: 1 CREAM TOPICAL at 19:10

## 2019-08-13 RX ADMIN — BETAMETHASONE DIPROPIONATE 1: 0.5 CREAM TOPICAL at 19:21

## 2019-08-13 RX ADMIN — KETOCONAZOLE 1: 20 CREAM TOPICAL at 19:12

## 2019-08-13 ASSESSMENT — LOCATION DETAILED DESCRIPTION DERM
LOCATION DETAILED: LEFT RADIAL PALM
LOCATION DETAILED: LEFT INSTEP
LOCATION DETAILED: RIGHT MEDIAL PLANTAR MIDFOOT
LOCATION DETAILED: LEFT MEDIAL PLANTAR MIDFOOT
LOCATION DETAILED: EPIGASTRIC SKIN
LOCATION DETAILED: RIGHT RADIAL PALM

## 2019-08-13 ASSESSMENT — LOCATION ZONE DERM
LOCATION ZONE: FEET
LOCATION ZONE: HAND
LOCATION ZONE: TRUNK

## 2019-08-13 ASSESSMENT — LOCATION SIMPLE DESCRIPTION DERM
LOCATION SIMPLE: ABDOMEN
LOCATION SIMPLE: LEFT PLANTAR SURFACE
LOCATION SIMPLE: LEFT HAND
LOCATION SIMPLE: RIGHT HAND
LOCATION SIMPLE: RIGHT PLANTAR SURFACE

## 2019-08-13 NOTE — PROCEDURE: BIOPSY BY SHAVE METHOD
Consent: Written consent was obtained and risks were reviewed including but not limited to scarring, infection, bleeding, scabbing, incomplete removal, nerve damage and allergy to anesthesia.
Type Of Destruction Used: Curettage
Electrodesiccation And Curettage Text: The wound bed was treated with electrodesiccation and curettage after the biopsy was performed.
X Size Of Lesion In Cm: 0
Biopsy Type: H and E
Bill For Surgical Tray: no
Depth Of Biopsy: dermis
Was A Bandage Applied: Yes
Silver Nitrate Text: The wound bed was treated with silver nitrate after the biopsy was performed.
Dressing: bandage
Anesthesia Volume In Cc (Will Not Render If 0): 0.5
Detail Level: Detailed
Billing Type: Third-Party Bill
Lab: -7
Biopsy Method: Personna blade
Hemostasis: Bharathi's
Curettage Text: The wound bed was treated with curettage after the biopsy was performed.
Electrodesiccation Text: The wound bed was treated with electrodesiccation after the biopsy was performed.
Notification Instructions: Patient will be notified of biopsy results. However, patient instructed to call the office if not contacted within 2 weeks.
Wound Care: Petrolatum
Post-Care Instructions: I reviewed with the patient in detail post-care instructions. Patient to keep bandaid on for 24 hours. Then remove, wash with soap and water and apply vaseline twice daily until healed.
Cryotherapy Text: The wound bed was treated with cryotherapy after the biopsy was performed.
Anesthesia Type: 1% lidocaine with epinephrine
Lab Facility: [id number]

## 2019-08-13 NOTE — PROCEDURE: MIPS QUALITY
Quality 431: Preventive Care And Screening: Unhealthy Alcohol Use - Screening: Patient screened for unhealthy alcohol use using a single question and scores less than 2 times per year
Quality 111:Pneumonia Vaccination Status For Older Adults: Pneumococcal Vaccination Previously Received
Detail Level: Detailed
Quality 226: Preventive Care And Screening: Tobacco Use: Screening And Cessation Intervention: Patient screened for tobacco use and is an ex/non-smoker
Quality 47: Advance Care Plan: Advance Care Planning discussed and documented in the medical record; patient did not wish or was not able to name a surrogate decision maker or provide an advance care plan.
Quality 130: Documentation Of Current Medications In The Medical Record: Current Medications Documented
